# Patient Record
Sex: FEMALE | Race: WHITE | URBAN - METROPOLITAN AREA
[De-identification: names, ages, dates, MRNs, and addresses within clinical notes are randomized per-mention and may not be internally consistent; named-entity substitution may affect disease eponyms.]

---

## 2023-03-29 ENCOUNTER — TRANSCRIBE ORDER (OUTPATIENT)
Dept: SCHEDULING | Age: 63
End: 2023-03-29

## 2023-03-29 DIAGNOSIS — Z87.891 PERSONAL HISTORY OF NICOTINE DEPENDENCE: Primary | ICD-10-CM

## 2023-03-30 ENCOUNTER — NURSE TRIAGE (OUTPATIENT)
Dept: OTHER | Facility: CLINIC | Age: 63
End: 2023-03-30

## 2023-03-30 NOTE — TELEPHONE ENCOUNTER
Location of patient: [address] waters from [place] at [facility] with [employer]; Patient with Red Flag Complaint requesting to establish care with [facility]. Subjective: Caller states \"I had a high blood pressure reading yesterday at my Pulmonologist office - I am being treated for an asthma exacerbation\"     Current Symptoms: Yesterday 179/83, shortness of air related to current diagnosis of asthma/COPD exacerbation, no chest pain    Onset: 6 months ago    Associated Symptoms: reduced activity    Pain Severity: 0/10    Temperature: denies     What has been tried: Losartan 25 mg once a day - has not missed any doses    LMP: NA Pregnant: NA    Recommended disposition: See PCP within 3 Days - Patient will be seen in Urgent if we are unable to establish care in the next 3 days    Care advice provided, patient verbalizes understanding; denies any other questions or concerns; instructed to call back for any new or worsening symptoms. Patient/Caller agrees with recommended disposition; writer provided warm transfer to [facility] for appointment scheduling (new patient appt)    Attention Provider: Thank you for allowing me to participate in the care of your patient. The patient was connected to triage in response to information provided to the [facility]. Please do not respond through this encounter as the response is not directed to a shared pool.       Reason for Disposition   Systolic BP >= 887 OR Diastolic >= 450    Protocols used: Blood Pressure - High-ADULT-OH

## 2023-04-22 ENCOUNTER — TRANSCRIBE ORDERS (OUTPATIENT)
Facility: HOSPITAL | Age: 63
End: 2023-04-22

## 2023-04-22 DIAGNOSIS — Z87.891 PERSONAL HISTORY OF TOBACCO USE, PRESENTING HAZARDS TO HEALTH: Primary | ICD-10-CM

## 2023-05-15 ENCOUNTER — OFFICE VISIT (OUTPATIENT)
Age: 63
End: 2023-05-15
Payer: MEDICARE

## 2023-05-15 VITALS
TEMPERATURE: 97.9 F | BODY MASS INDEX: 26.53 KG/M2 | DIASTOLIC BLOOD PRESSURE: 73 MMHG | OXYGEN SATURATION: 98 % | RESPIRATION RATE: 16 BRPM | WEIGHT: 169 LBS | SYSTOLIC BLOOD PRESSURE: 146 MMHG | HEIGHT: 67 IN | HEART RATE: 72 BPM

## 2023-05-15 DIAGNOSIS — Z76.89 ENCOUNTER TO ESTABLISH CARE WITH NEW DOCTOR: Primary | ICD-10-CM

## 2023-05-15 DIAGNOSIS — N95.1 MENOPAUSAL SYNDROME (HOT FLASHES): ICD-10-CM

## 2023-05-15 DIAGNOSIS — I10 PRIMARY HYPERTENSION: ICD-10-CM

## 2023-05-15 DIAGNOSIS — J44.9 CHRONIC OBSTRUCTIVE PULMONARY DISEASE, UNSPECIFIED COPD TYPE (HCC): ICD-10-CM

## 2023-05-15 PROCEDURE — 3023F SPIROM DOC REV: CPT | Performed by: FAMILY MEDICINE

## 2023-05-15 PROCEDURE — G8400 PT W/DXA NO RESULTS DOC: HCPCS | Performed by: FAMILY MEDICINE

## 2023-05-15 PROCEDURE — 1036F TOBACCO NON-USER: CPT | Performed by: FAMILY MEDICINE

## 2023-05-15 PROCEDURE — 3077F SYST BP >= 140 MM HG: CPT | Performed by: FAMILY MEDICINE

## 2023-05-15 PROCEDURE — 1090F PRES/ABSN URINE INCON ASSESS: CPT | Performed by: FAMILY MEDICINE

## 2023-05-15 PROCEDURE — 1123F ACP DISCUSS/DSCN MKR DOCD: CPT | Performed by: FAMILY MEDICINE

## 2023-05-15 PROCEDURE — 3078F DIAST BP <80 MM HG: CPT | Performed by: FAMILY MEDICINE

## 2023-05-15 PROCEDURE — G8419 CALC BMI OUT NRM PARAM NOF/U: HCPCS | Performed by: FAMILY MEDICINE

## 2023-05-15 PROCEDURE — 99204 OFFICE O/P NEW MOD 45 MIN: CPT | Performed by: FAMILY MEDICINE

## 2023-05-15 PROCEDURE — 3017F COLORECTAL CA SCREEN DOC REV: CPT | Performed by: FAMILY MEDICINE

## 2023-05-15 PROCEDURE — G8427 DOCREV CUR MEDS BY ELIG CLIN: HCPCS | Performed by: FAMILY MEDICINE

## 2023-05-15 RX ORDER — PREDNISONE 1 MG/1
5 TABLET ORAL EVERY OTHER DAY
COMMUNITY

## 2023-05-15 RX ORDER — HYDROCHLOROTHIAZIDE 25 MG/1
TABLET ORAL
COMMUNITY
Start: 2023-05-01 | End: 2023-05-15 | Stop reason: CLARIF

## 2023-05-15 RX ORDER — ESTRADIOL 0.5 MG/1
0.5 TABLET ORAL DAILY
Qty: 90 TABLET | Refills: 0 | Status: SHIPPED | OUTPATIENT
Start: 2023-05-15

## 2023-05-15 RX ORDER — LOSARTAN POTASSIUM 50 MG/1
TABLET ORAL
COMMUNITY
Start: 2023-03-31

## 2023-05-15 RX ORDER — ESTRADIOL 2 MG/1
2 TABLET ORAL DAILY
COMMUNITY
End: 2023-05-15 | Stop reason: SDUPTHER

## 2023-05-15 RX ORDER — ALBUTEROL SULFATE 90 UG/1
AEROSOL, METERED RESPIRATORY (INHALATION)
COMMUNITY
Start: 2023-04-20

## 2023-05-15 RX ORDER — BUDESONIDE AND FORMOTEROL FUMARATE DIHYDRATE 160; 4.5 UG/1; UG/1
2 AEROSOL RESPIRATORY (INHALATION) 2 TIMES DAILY
COMMUNITY
Start: 2023-04-22

## 2023-05-15 RX ORDER — PREDNISONE 20 MG/1
TABLET ORAL
COMMUNITY
Start: 2023-05-09

## 2023-05-15 RX ORDER — AMOXICILLIN AND CLAVULANATE POTASSIUM 875; 125 MG/1; MG/1
1 TABLET, FILM COATED ORAL EVERY 12 HOURS
COMMUNITY
Start: 2023-03-29

## 2023-05-15 RX ORDER — AZITHROMYCIN 500 MG/1
500 TABLET, FILM COATED ORAL DAILY
COMMUNITY

## 2023-05-15 SDOH — ECONOMIC STABILITY: FOOD INSECURITY: WITHIN THE PAST 12 MONTHS, THE FOOD YOU BOUGHT JUST DIDN'T LAST AND YOU DIDN'T HAVE MONEY TO GET MORE.: NEVER TRUE

## 2023-05-15 SDOH — ECONOMIC STABILITY: FOOD INSECURITY: WITHIN THE PAST 12 MONTHS, YOU WORRIED THAT YOUR FOOD WOULD RUN OUT BEFORE YOU GOT MONEY TO BUY MORE.: NEVER TRUE

## 2023-05-15 SDOH — ECONOMIC STABILITY: HOUSING INSECURITY
IN THE LAST 12 MONTHS, WAS THERE A TIME WHEN YOU DID NOT HAVE A STEADY PLACE TO SLEEP OR SLEPT IN A SHELTER (INCLUDING NOW)?: NO

## 2023-05-15 SDOH — ECONOMIC STABILITY: INCOME INSECURITY: HOW HARD IS IT FOR YOU TO PAY FOR THE VERY BASICS LIKE FOOD, HOUSING, MEDICAL CARE, AND HEATING?: NOT HARD AT ALL

## 2023-05-15 ASSESSMENT — ENCOUNTER SYMPTOMS
NAUSEA: 0
EYE DISCHARGE: 0
COUGH: 0
EYE PAIN: 0
EYE ITCHING: 0
SORE THROAT: 0
CONSTIPATION: 0
ABDOMINAL PAIN: 0
CHEST TIGHTNESS: 0
DIARRHEA: 0
SHORTNESS OF BREATH: 0
BLOOD IN STOOL: 0
WHEEZING: 0
VOMITING: 0
SINUS PAIN: 0

## 2023-05-15 ASSESSMENT — PATIENT HEALTH QUESTIONNAIRE - PHQ9
SUM OF ALL RESPONSES TO PHQ QUESTIONS 1-9: 0
SUM OF ALL RESPONSES TO PHQ9 QUESTIONS 1 & 2: 0
2. FEELING DOWN, DEPRESSED OR HOPELESS: 0
SUM OF ALL RESPONSES TO PHQ QUESTIONS 1-9: 0
SUM OF ALL RESPONSES TO PHQ QUESTIONS 1-9: 0
1. LITTLE INTEREST OR PLEASURE IN DOING THINGS: 0
SUM OF ALL RESPONSES TO PHQ QUESTIONS 1-9: 0

## 2023-05-15 NOTE — PROGRESS NOTES
Chief Complaint   Patient presents with    New Patient         1. \"Have you been to the ER, urgent care clinic since your last visit? Hospitalized since your last visit? \"                             no    2. \"Have you seen or consulted any other health care providers outside of the [address] [place] [address] since your last visit? \"               no    3. For patients aged 39-70: Has the patient had a colonoscopy / FIT/ Cologuard? Yes      If the patient is female:    4. For patients aged 41-77: Has the patient had a mammogram within the past 2 years? YES      5. For patients aged 21-65: Has the patient had a pap smear?  N/A    PHQ-9  5/15/2023   Little interest or pleasure in doing things 0   Feeling down, depressed, or hopeless 0   PHQ-2 Score 0   PHQ-9 Total Score 0           Financial Resource Strain: Low Risk     Difficulty of Paying Living Expenses: Not hard at all      Food Insecurity: No Food Insecurity    Worried About Running Out of Food in the Last Year: Never true    Ran Out of Food in the Last Year: Never true          Health Maintenance Due   Topic Date Due    COVID-19 Vaccine (1) Never done    Depression Screen  Never done    HIV screen  Never done    Hepatitis C screen  Never done    DTaP/Tdap/Td vaccine (1 - Tdap) Never done    Cervical cancer screen  Never done    Lipids  Never done    Colorectal Cancer Screen  Never done    Breast cancer screen  Never done    Shingles vaccine (1 of 2) Never done    DEXA (modify frequency per FRAX score)  Never done    Pneumococcal 65+ years Vaccine (1 - PCV) Never done    Annual Wellness Visit (AWV)  05/06/2023
effort is normal. No respiratory distress. Breath sounds: Normal breath sounds. No wheezing or rales. Chest:      Chest wall: No tenderness. Abdominal:      General: Bowel sounds are normal. There is no distension. Palpations: Abdomen is soft. There is no mass. Tenderness: There is no abdominal tenderness. There is no guarding or rebound. Hernia: No hernia is present. Musculoskeletal:         General: No swelling or tenderness. Cervical back: Normal range of motion. Skin:     General: Skin is warm and dry. Findings: No rash. Neurological:      General: No focal deficit present. Mental Status: She is alert and oriented to person, place, and time. Mental status is at baseline. ASSESSMENT AND PLAN  Tristan Flood is a 70 y.o. female who presents today for:    1. Encounter to establish care with new doctor    2. Primary hypertension  Slightly high today. Hold off on HCTZ and continue with losaran. Will check BMP. Might be high due to higher steroid doses. Will monitor.  - Basic Metabolic Panel; Future  - CBC; Future  - CBC  - Basic Metabolic Panel    3. Menopausal syndrome (hot flashes)  Recommend reducing her estrogen dose given concern of possible effects of ongoing estrogen (i.e. elevated blood pressure readings, increased risk for blood clots, stroke). May be having hot flash symptoms due to being on steroids. Also check thyroid. We will plan to decrease her estrogen to 0.5 mg daily and wean down as tolerated. - TSH; Future  - TSH  - estradiol (ESTRACE) 0.5 MG tablet; Take 1 tablet by mouth daily  Dispense: 90 tablet; Refill: 0    4. Chronic obstructive pulmonary disease, unspecified COPD type ([facility] [address]  Stable, continue current treatment.       Orders Placed This Encounter   Medications    estradiol (ESTRACE) 0.5 MG tablet     Sig: Take 1 tablet by mouth daily     Dispense:  90 tablet     Refill:  0        Medications Discontinued During This Encounter   Medication

## 2023-05-16 LAB
ANION GAP SERPL CALC-SCNC: 3 MMOL/L (ref 5–15)
BUN SERPL-MCNC: 10 MG/DL (ref 6–20)
BUN/CREAT SERPL: 14 (ref 12–20)
CALCIUM SERPL-MCNC: 9.2 MG/DL (ref 8.5–10.1)
CHLORIDE SERPL-SCNC: 102 MMOL/L (ref 97–108)
CO2 SERPL-SCNC: 31 MMOL/L (ref 21–32)
CREAT SERPL-MCNC: 0.71 MG/DL (ref 0.55–1.02)
ERYTHROCYTE [DISTWIDTH] IN BLOOD BY AUTOMATED COUNT: 13.8 % (ref 11.5–14.5)
GLUCOSE SERPL-MCNC: 153 MG/DL (ref 65–100)
HCT VFR BLD AUTO: 41.5 % (ref 35–47)
HGB BLD-MCNC: 12.8 G/DL (ref 11.5–16)
MCH RBC QN AUTO: 29.6 PG (ref 26–34)
MCHC RBC AUTO-ENTMCNC: 30.8 G/DL (ref 30–36.5)
MCV RBC AUTO: 96.1 FL (ref 80–99)
NRBC # BLD: 0 K/UL (ref 0–0.01)
NRBC BLD-RTO: 0 PER 100 WBC
PLATELET # BLD AUTO: 383 K/UL (ref 150–400)
PMV BLD AUTO: 9.8 FL (ref 8.9–12.9)
POTASSIUM SERPL-SCNC: 5.1 MMOL/L (ref 3.5–5.1)
RBC # BLD AUTO: 4.32 M/UL (ref 3.8–5.2)
SODIUM SERPL-SCNC: 136 MMOL/L (ref 136–145)
TSH SERPL DL<=0.05 MIU/L-ACNC: 0.2 UIU/ML (ref 0.36–3.74)
WBC # BLD AUTO: 6.4 K/UL (ref 3.6–11)

## 2023-05-18 ENCOUNTER — TELEPHONE (OUTPATIENT)
Age: 63
End: 2023-05-18

## 2023-05-18 DIAGNOSIS — R79.89 ABNORMAL TSH: Primary | ICD-10-CM

## 2023-05-18 NOTE — TELEPHONE ENCOUNTER
Called patient. Two patient identifiers verified. Discussed lab results per provider's note. Verbalized understanding. Scheduled  a fasting lab appointment for 6/23/23.

## 2023-05-18 NOTE — TELEPHONE ENCOUNTER
----- Message from Erwin Willams MD sent at 5/18/2023 10:27 AM EDT -----  Please call patient:    Her thyroid level is a little abnormal but this can be a temporary finding as thyroid levels can go up and down. I recommend repeating the lab in 1 month. Pt to schedule non fasting appt.  Other labs are normal.

## 2023-06-23 ENCOUNTER — NURSE ONLY (OUTPATIENT)
Age: 63
End: 2023-06-23

## 2023-06-23 DIAGNOSIS — R79.89 ABNORMAL TSH: ICD-10-CM

## 2023-06-23 LAB — TSH SERPL DL<=0.05 MIU/L-ACNC: 1.09 UIU/ML (ref 0.36–3.74)

## 2023-07-06 NOTE — TELEPHONE ENCOUNTER
Brenda requesting new rx for losartan 50mg.   Thanks, Afsaneh Hogan    Last appointment: 5/15/23 MD An Begum  Next appointment: 8/15/23 An Begum  Previous refill encounter(s): historical provider    Requested Prescriptions     Pending Prescriptions Disp Refills    losartan (COZAAR) 50 MG tablet 90 tablet 1     Sig: Take 1 tablet by mouth daily For blood pressure     For Pharmacy Admin Tracking Only    Program: Medication Refill  Intervention Detail: New Rx: 1, reason: Patient Preference  Time Spent (min): 5

## 2023-07-07 ENCOUNTER — HOSPITAL ENCOUNTER (OUTPATIENT)
Age: 63
End: 2023-07-07
Attending: INTERNAL MEDICINE
Payer: MEDICARE

## 2023-07-07 DIAGNOSIS — Z87.891 PERSONAL HISTORY OF TOBACCO USE, PRESENTING HAZARDS TO HEALTH: ICD-10-CM

## 2023-07-07 PROCEDURE — 71271 CT THORAX LUNG CANCER SCR C-: CPT

## 2023-07-07 RX ORDER — LOSARTAN POTASSIUM 50 MG/1
50 TABLET ORAL DAILY
Qty: 90 TABLET | Refills: 1 | Status: SHIPPED | OUTPATIENT
Start: 2023-07-07

## 2023-08-31 NOTE — TELEPHONE ENCOUNTER
MD Fuentes Matias,    Patient call requesting refill of the prednisone 5mg and also the \"Exacerbation bundle\"? that was sent by her md in [place]. Please advise on the bundle or does that come from pulmonologist?  Thanks, Renata Rodriguez    Last appointment: 5/15/23 MD Fuentes Matias  Next appointment: Not rescheduled- patient cancelled 8/15/23 appt. Previous refill encounter(s): historical provider- new patient from [facility]. Requested Prescriptions     Pending Prescriptions Disp Refills    predniSONE (DELTASONE) 5 MG tablet 45 tablet 1     Sig: Take 1 tablet by mouth every other day     For Pharmacy Admin Tracking Only    Program: Medication Refill  CPA in place:    Recommendation Provided To:    Intervention Detail: New Rx: 1, reason: Patient Preference  Intervention Accepted By:   Comfort Barker Closed?:    Time Spent (min): 10

## 2023-09-05 RX ORDER — PREDNISONE 5 MG/1
5 TABLET ORAL EVERY OTHER DAY
Qty: 45 TABLET | Refills: 1 | OUTPATIENT
Start: 2023-09-05

## 2023-09-05 NOTE — TELEPHONE ENCOUNTER
Called Patient. Name and  confirmed. Informed her that medication is managed by your lung doctor. She verbalized understanding.

## 2023-09-19 ENCOUNTER — OFFICE VISIT (OUTPATIENT)
Dept: FAMILY MEDICINE | Facility: CLINIC | Age: 63
End: 2023-09-19
Payer: MEDICARE

## 2023-09-19 VITALS
BODY MASS INDEX: 25.02 KG/M2 | WEIGHT: 165.1 LBS | OXYGEN SATURATION: 94 % | HEART RATE: 85 BPM | DIASTOLIC BLOOD PRESSURE: 84 MMHG | SYSTOLIC BLOOD PRESSURE: 142 MMHG | RESPIRATION RATE: 18 BRPM | HEIGHT: 68 IN | TEMPERATURE: 98.2 F

## 2023-09-19 DIAGNOSIS — J44.9 CHRONIC OBSTRUCTIVE PULMONARY DISEASE, UNSPECIFIED COPD TYPE (CMS/HCC): ICD-10-CM

## 2023-09-19 DIAGNOSIS — I10 ESSENTIAL (PRIMARY) HYPERTENSION: ICD-10-CM

## 2023-09-19 DIAGNOSIS — Z00.00 WELLNESS EXAMINATION: Primary | ICD-10-CM

## 2023-09-19 DIAGNOSIS — R00.2 PALPITATION: ICD-10-CM

## 2023-09-19 PROCEDURE — 99203 OFFICE O/P NEW LOW 30 MIN: CPT | Mod: GE

## 2023-09-19 PROCEDURE — G0463 HOSPITAL OUTPT CLINIC VISIT: HCPCS | Mod: PO

## 2023-09-19 PROCEDURE — G0463 HOSPITAL OUTPT CLINIC VISIT: HCPCS

## 2023-09-19 RX ORDER — PREDNISOLONE 5 MG/1
5 TABLET ORAL DAILY
COMMUNITY

## 2023-09-19 RX ORDER — BUDESONIDE AND FORMOTEROL FUMARATE DIHYDRATE 160; 4.5 UG/1; UG/1
AEROSOL RESPIRATORY (INHALATION)
COMMUNITY
Start: 2023-09-18

## 2023-09-19 RX ORDER — AZITHROMYCIN 500 MG/1
250 TABLET, FILM COATED ORAL DAILY
COMMUNITY
Start: 2023-07-07

## 2023-09-19 RX ORDER — LOSARTAN POTASSIUM 50 MG/1
50 TABLET ORAL DAILY
COMMUNITY
Start: 2023-09-03

## 2023-09-19 RX ORDER — ESTRADIOL 1 MG/1
0.25 TABLET ORAL DAILY
COMMUNITY

## 2023-09-19 ASSESSMENT — ENCOUNTER SYMPTOMS
SLEEP DISTURBANCE: 1
SHORTNESS OF BREATH: 1
FEVER: 0
VOMITING: 0
NAUSEA: 0
PALPITATIONS: 1
WHEEZING: 1
DIZZINESS: 0
DYSURIA: 0
UNEXPECTED WEIGHT CHANGE: 0
TROUBLE SWALLOWING: 0

## 2023-09-19 ASSESSMENT — PAIN SCALES - GENERAL: PAINLEVEL: 0-NO PAIN

## 2023-09-19 NOTE — PROGRESS NOTES
"OUTPATIENT PROGRESS NOTE    Subjective   HPI: Cely Castanon  is a 63 y.o. female who presents presents to [facility] care.  Past medical history significant for COPD.  Main concern today palpitations.  She has had these palpitations in the past but they were much shorter in duration and less frequent.  Since moving to [place] she is now having episodes daily, these last for several minutes at a time, sometimes more.  Denies associated diaphoresis, chest pain or worsened shortness of breath.  No dizziness or lightheadedness.  Past medical history significant for mild hypertension, no known dysrhythmias or prior MIs.  No identifiable triggers,  happen with less frequency if she has a glass of wine.     I have reviewed the patients active problem list, medication list, and past medical history and updated pertinent portions of the chart.    Review of Systems   Constitutional:  Negative for fever and unexpected weight change.   HENT:  Negative for trouble swallowing.    Eyes:  Negative for visual disturbance.   Respiratory:  Positive for shortness of breath and wheezing.    Cardiovascular:  Positive for palpitations. Negative for leg swelling.   Gastrointestinal:  Negative for nausea and vomiting.   Genitourinary:  Negative for dysuria.   Neurological:  Negative for dizziness and syncope.   Psychiatric/Behavioral:  Positive for sleep disturbance.        Objective   /84 (BP Location: Right arm, Patient Position: Sitting, Cuff Size: Regular Adult)   Pulse 85   Temp 36.8 °C (98.2 °F) (Temporal)   Resp 18   Ht 1.727 m (5' 8\")   Wt 74.9 kg (165 lb 1.6 oz)   SpO2 94%   BMI 25.10 kg/m²   Wt Readings from Last 3 Encounters:   09/19/23 74.9 kg (165 lb 1.6 oz)       EXAM:  Physical Exam  Vitals and nursing note reviewed.   HENT:      Head: Normocephalic and atraumatic.      Nose: Nose normal.      Mouth/Throat:      Mouth: Mucous membranes are moist.   Eyes:      Conjunctiva/sclera: Conjunctivae normal.   Neck:      " Comments: Cyst on the right upper neck area   Cardiovascular:      Rate and Rhythm: Normal rate and regular rhythm.      Heart sounds: Normal heart sounds.   Pulmonary:      Effort: Pulmonary effort is normal.      Breath sounds: Decreased breath sounds present. No wheezing.   Musculoskeletal:      Right lower leg: No edema.      Left lower leg: No edema.   Lymphadenopathy:      Cervical: Cervical adenopathy present.   Skin:     General: Skin is warm and dry.   Neurological:      General: No focal deficit present.      Mental Status: She is alert.   Psychiatric:         Mood and Affect: Mood normal.             Assessment/Plan   Diagnoses and all orders for this visit:    Wellness examination    Palpitation  -     Holter monitor 48 hour; Future  -     CBC w/auto differential Blood, Venous; Future  -     Comprehensive metabolic panel Blood, Venous; Future  -     Lipid panel Blood, Venous; Future  -     Thyroid Stimulating Hormone, Ultrasensitive Blood, Venous; Future  -     Urinalysis w/microscopic Urine, Clean Catch; Future    Essential (primary) hypertension  -     Lipid panel Blood, Venous; Future    Chronic obstructive pulmonary disease, unspecified COPD type (CMS/HCC)  -     Ambulatory referral to Pulmonology; Future        Detailed Assessment and Plan:  Palpitations:  48 hr holter monitor ordered today   Cmp, cbc, tsh pending    COPD:  Stable on current regime   Pulm referral sent       Next follow up in 3 months with me, sooner if needed.    Sincerely,      Electronically signed by: Katrina Adams MD  9/19/2023  1:08 PM

## 2023-09-19 NOTE — PATIENT INSTRUCTIONS
Someone should call you within a week to schedule your holter monitor and pulmonology appointment.

## 2023-09-20 DIAGNOSIS — J44.9 CHRONIC OBSTRUCTIVE PULMONARY DISEASE, UNSPECIFIED COPD TYPE (CMS/HCC): Primary | ICD-10-CM

## 2023-10-03 ENCOUNTER — ANCILLARY PROCEDURE (OUTPATIENT)
Dept: CARDIOLOGY | Facility: CLINIC | Age: 63
End: 2023-10-03
Payer: MEDICARE

## 2023-10-03 DIAGNOSIS — R00.2 PALPITATION: ICD-10-CM

## 2023-10-03 PROCEDURE — 93226 XTRNL ECG REC<48 HR SCAN A/R: CPT

## 2023-10-06 LAB
RH CV SUPRAVENT % TOTAL BEATS: 0.17 %
RH CV TOTAL BEATS: NORMAL BEATS
RH CV VENTRICULAR % TOTAL BEATS: 1.41 %
RH SUPRAVENTRICULAR BEATS: 398 BEATS
RH VENTRICULAR BEATS: 3232 BEATS

## 2023-10-06 PROCEDURE — 93227 XTRNL ECG REC<48 HR R&I: CPT | Performed by: STUDENT IN AN ORGANIZED HEALTH CARE EDUCATION/TRAINING PROGRAM

## 2023-10-24 ENCOUNTER — TELEPHONE (OUTPATIENT)
Dept: FAMILY MEDICINE | Facility: CLINIC | Age: 63
End: 2023-10-24
Payer: MEDICARE

## 2023-10-24 DIAGNOSIS — J44.1 CHRONIC OBSTRUCTIVE PULMONARY DISEASE WITH ACUTE EXACERBATION (CMS/HCC): Primary | ICD-10-CM

## 2023-10-24 RX ORDER — PREDNISONE 10 MG/1
20 TABLET ORAL DAILY
Qty: 10 TABLET | Refills: 0 | Status: SHIPPED | OUTPATIENT
Start: 2023-10-24 | End: 2023-10-29

## 2023-10-24 NOTE — TELEPHONE ENCOUNTER
Caller would like to discuss (a) return call Writer has advised caller of a callback from within 24 hours.    Patient: Cely Castanon    Caller Name (Last and first, relation/role): self    Name of Facility: na    Callback Number: [phone]    Best Availability: any    Fax Number: na    Additional Info: Would like a 10 day prednisone pack for COPD flare. Please call pt to status     Did you confirm the message with the caller: Yes    Is it okay that the nurse communicates your response through [employer]? No

## 2023-11-13 DIAGNOSIS — N95.1 MENOPAUSAL SYNDROME (HOT FLASHES): ICD-10-CM

## 2023-11-13 NOTE — TELEPHONE ENCOUNTER
PCP: Yesika Loera MD    Last appt: 5/15/2023       No future appointments.     Requested Prescriptions     Pending Prescriptions Disp Refills    estradiol (ESTRACE) 0.5 MG tablet [Pharmacy Med Name: ESTRADIOL 0.5 MG TABLET] 90 tablet 0     Sig: TAKE 1 TABLET BY MOUTH EVERY DAY       Prior labs and Blood pressures:  BP Readings from Last 3 Encounters:   05/15/23 (!) 146/73     Lab Results   Component Value Date/Time     05/15/2023 02:20 PM    K 5.1 05/15/2023 02:20 PM     05/15/2023 02:20 PM    CO2 31 05/15/2023 02:20 PM    BUN 10 05/15/2023 02:20 PM     No results found for: \"HBA1C\", \"[id number]\"  No results found for: \"CHOL\", \"CHOLPOCT\", \"CHOLX\", \"CHLST\", \"CHOLV\", \"HDL\", \"HDLPOC\", \"HDLC\", \"LDL\", \"LDLC\", \"VLDLC\", \"VLDL\", \"TGLX\", \"TRIGL\"  No results found for: \"VITD3\", \"VD3RIA\"    No results found for: \"TSH\", \"TSH2\", \"TSH3\"

## 2023-11-14 RX ORDER — ESTRADIOL 0.5 MG/1
0.5 TABLET ORAL DAILY
Qty: 90 TABLET | Refills: 0 | OUTPATIENT
Start: 2023-11-14

## 2023-11-17 DIAGNOSIS — N95.1 MENOPAUSAL SYNDROME (HOT FLASHES): ICD-10-CM

## 2023-11-17 RX ORDER — ESTRADIOL 0.5 MG/1
0.25 TABLET ORAL DAILY
Qty: 30 TABLET | Refills: 0 | Status: SHIPPED | OUTPATIENT
Start: 2023-11-17

## 2023-11-17 NOTE — TELEPHONE ENCOUNTER
Patient requesting refill from [facility] in [place], [place] attached.  Antoine Wendy    Last appointment: 5/15/23 MD Oconnor  Next appointment: None  Previous refill encounter(s): 5/15/23 90    Requested Prescriptions     Pending Prescriptions Disp Refills    estradiol (ESTRACE) 0.5 MG tablet 90 tablet 0     Sig: Take 1 tablet by mouth daily     For Pharmacy Admin Tracking Only    Program: Medication Refill  CPA in place:    Recommendation Provided To:   Intervention Detail: New Rx: 1, reason: Patient Preference  Intervention Accepted By:   Gap Closed?:    Time Spent (min): 5

## 2023-11-17 NOTE — TELEPHONE ENCOUNTER
MD Oconnor,    (Sorry I missed that request/refusal from 11/13/23)    Tried to contact patient as well w/no answer.  LVM for return call.    Contacted the [facility] in [place] to pull rx and advised information to advise patient to contact office for appt for refills and dose change.  Stated they will note it.      Thanks, Wendy

## 2023-11-17 NOTE — TELEPHONE ENCOUNTER
We have tried to contact patient several time regarding this prescription but she has not answered the phone.  Her last refill was in May, lasting 90 days so I'm unsure why I am just now getting a refill. Is she still doing 0.5 mg daily? I would recommend decreasing it 1/2 tab (0.25 mg) daily.  She does need another appt with me to follow up. I will send a 30 day supply to local [facility] in [place] and they can transfer the prescription to [place]; I cannot prescribe directly to [place] location. Needs appt for additional refills.

## 2024-02-06 RX ORDER — LOSARTAN POTASSIUM 50 MG/1
TABLET ORAL
Qty: 90 TABLET | Refills: 0 | Status: SHIPPED | OUTPATIENT
Start: 2024-02-06

## 2024-02-06 NOTE — TELEPHONE ENCOUNTER
Spoke to pt on 2/6/24 informed her that  approved her Metformin and that she has not seen  since 5/23.Pt then stated that she has moved back to [place].

## 2024-02-06 NOTE — TELEPHONE ENCOUNTER
PCP: Umu Oconnor MD    Last appt: 5/15/2023       No future appointments.    Requested Prescriptions     Pending Prescriptions Disp Refills    losartan (COZAAR) 50 MG tablet [Pharmacy Med Name: LOSARTAN POTASSIUM 50 MG Tablet] 90 tablet 3     Sig: TAKE 1 TABLET EVERY DAY FOR BLOOD PRESSURE       Prior labs and Blood pressures:  BP Readings from Last 3 Encounters:   05/15/23 (!) 146/73     Lab Results   Component Value Date/Time     05/15/2023 02:20 PM    K 5.1 05/15/2023 02:20 PM     05/15/2023 02:20 PM    CO2 31 05/15/2023 02:20 PM    BUN 10 05/15/2023 02:20 PM     No results found for: \"HBA1C\", \"ICE1MWYS\"  No results found for: \"CHOL\", \"CHOLPOCT\", \"CHOLX\", \"CHLST\", \"CHOLV\", \"HDL\", \"HDLPOC\", \"HDLC\", \"LDL\", \"LDLC\", \"VLDLC\", \"VLDL\", \"TGLX\", \"TRIGL\"  No results found for: \"VITD3\", \"VD3RIA\"    No results found for: \"TSH\", \"TSH2\", \"TSH3\"

## 2024-05-09 RX ORDER — LOSARTAN POTASSIUM 50 MG/1
TABLET ORAL
Qty: 90 TABLET | Refills: 3 | OUTPATIENT
Start: 2024-05-09

## 2024-09-24 ENCOUNTER — TELEPHONE (OUTPATIENT)
Dept: OTHER | Facility: CLINIC | Age: 64
End: 2024-09-24